# Patient Record
Sex: FEMALE | Race: WHITE | NOT HISPANIC OR LATINO | Employment: OTHER | URBAN - METROPOLITAN AREA
[De-identification: names, ages, dates, MRNs, and addresses within clinical notes are randomized per-mention and may not be internally consistent; named-entity substitution may affect disease eponyms.]

---

## 2024-08-08 ENCOUNTER — OFFICE VISIT (OUTPATIENT)
Dept: ORTHOPEDIC SURGERY | Facility: CLINIC | Age: 74
End: 2024-08-08
Payer: MEDICARE

## 2024-08-08 VITALS
HEIGHT: 63 IN | WEIGHT: 195.2 LBS | DIASTOLIC BLOOD PRESSURE: 80 MMHG | BODY MASS INDEX: 34.59 KG/M2 | SYSTOLIC BLOOD PRESSURE: 142 MMHG

## 2024-08-08 DIAGNOSIS — M75.22 BICEPS TENDINITIS OF LEFT UPPER EXTREMITY: ICD-10-CM

## 2024-08-08 DIAGNOSIS — M25.511 BILATERAL SHOULDER PAIN, UNSPECIFIED CHRONICITY: ICD-10-CM

## 2024-08-08 DIAGNOSIS — M75.52 BURSITIS OF LEFT SHOULDER: ICD-10-CM

## 2024-08-08 DIAGNOSIS — M67.911 DYSFUNCTION OF RIGHT ROTATOR CUFF: ICD-10-CM

## 2024-08-08 DIAGNOSIS — M25.512 BILATERAL SHOULDER PAIN, UNSPECIFIED CHRONICITY: ICD-10-CM

## 2024-08-08 DIAGNOSIS — S46.012A TRAUMATIC COMPLETE TEAR OF LEFT ROTATOR CUFF, INITIAL ENCOUNTER: Primary | ICD-10-CM

## 2024-08-08 PROCEDURE — 99204 OFFICE O/P NEW MOD 45 MIN: CPT

## 2024-08-08 RX ORDER — ASPIRIN 81 MG/1
81 TABLET ORAL DAILY
COMMUNITY

## 2024-08-08 RX ORDER — BIOTIN 1 MG
5000 TABLET ORAL DAILY
COMMUNITY

## 2024-08-08 RX ORDER — ACETAMINOPHEN 325 MG/1
650 TABLET ORAL EVERY 6 HOURS PRN
COMMUNITY

## 2024-08-08 RX ORDER — VALSARTAN 80 MG/1
1 TABLET ORAL DAILY
COMMUNITY
Start: 2024-07-29

## 2024-08-08 RX ORDER — BACLOFEN 10 MG/1
10 TABLET ORAL
COMMUNITY
Start: 2024-07-30

## 2024-08-08 RX ORDER — TEMAZEPAM 15 MG/1
15 CAPSULE ORAL NIGHTLY PRN
COMMUNITY

## 2024-08-08 RX ORDER — LEVOTHYROXINE SODIUM 0.07 MG/1
1 TABLET ORAL DAILY
COMMUNITY
Start: 2024-06-20

## 2024-08-08 RX ORDER — EZETIMIBE 10 MG/1
1 TABLET ORAL DAILY
COMMUNITY
Start: 2024-06-28

## 2024-08-08 NOTE — PROGRESS NOTES
[facility] Office Visit - Shania Morris PA-C    Office Visit       Patient Name: Dylan Garcia    Chief Complaint:   Chief Complaint   Patient presents with    Left Shoulder - Pain    Right Shoulder - Pain       Referring Physician: Jonathon Nayak MD    History of Present Illness:   Dylan Garcia is a 74 y.o. female who presents with bilateral shoulder pain.  Left worse than right.  She says she fell forward on 7/3/2024 landing on the left side of her face and left shoulder.  X-ray images and MRI of the left shoulder have been obtained.  Referred by her primary care provider for further evaluation.    She has a long history with bilateral shoulder pain since approximately 6 years ago.  She says she felt forward onto both shoulders at that time.  She was seen by an orthopedic surgeon in [place] who told her she would likely need shoulder surgery in the future.  Right shoulder had her worsening left prior to most recent injury.  No prior surgeries or injections to the shoulders.    Rates pain as 7/10.  Described as dull and aching.  She takes Tylenol as needed.    Here today with supportive .  She moved from [place] to [place] a couple of years ago.    Medical history includes severe aortic stenosis and congestive heart failure    Subjective     Review of Systems     Past Medical History:   Past Medical History:   Diagnosis Date    Hypertension 2021       Past Surgical History:   Past Surgical History:   Procedure Laterality Date    ANKLE SURGERY Right 1992    HYSTERECTOMY N/A 2000    LAPAROSCOPIC CHOLECYSTECTOMY N/A 2004    TOTAL KNEE ARTHROPLASTY Bilateral 2014       Family History:   Family History   Problem Relation Age of Onset    Cancer Mother        Social History:   Social History     Socioeconomic History    Marital status:    Tobacco Use    Smoking status: Never    Smokeless tobacco: Never    Vaping Use    Vaping status: Never Used   Substance and Sexual Activity    Alcohol use: Yes    Drug use: Never    Sexual activity: Defer       Medications:   Current Outpatient Medications:     acetaminophen (TYLENOL) 325 MG tablet, Take 2 tablets by mouth Every 6 (Six) Hours As Needed., Disp: , Rfl:     aspirin 81 MG EC tablet, Take 1 tablet by mouth Daily., Disp: , Rfl:     baclofen (LIORESAL) 10 MG tablet, Take 1 tablet by mouth every night at bedtime., Disp: , Rfl:     Biotin 1000 MCG tablet, Take 5,000 mcg by mouth Daily., Disp: , Rfl:     ezetimibe (ZETIA) 10 MG tablet, Take 1 tablet by mouth Daily., Disp: , Rfl:     levothyroxine (SYNTHROID, LEVOTHROID) 75 MCG tablet, Take 1 tablet by mouth Daily., Disp: , Rfl:     temazepam (RESTORIL) 15 MG capsule, Take 1 capsule by mouth At Night As Needed for Sleep., Disp: , Rfl:     valsartan (DIOVAN) 80 MG tablet, Take 1 tablet by mouth Daily., Disp: , Rfl:     Allergies:   Allergies   Allergen Reactions    Chlorhexidine Itching and Unknown (See Comments)    Povidone Iodine Itching and Unknown (See Comments)     Topical betadine    Adhesive Tape Rash    Amlodipine Unknown (See Comments)    Atorvastatin Unknown (See Comments)    Azithromycin Unknown (See Comments)    Cephalexin Unknown (See Comments)    Codeine Unknown (See Comments)    Iodine Rash    Latex Unknown (See Comments)    Morphine Unknown (See Comments)    Nickel Rash and Unknown (See Comments)    Other Unknown (See Comments)    Penicillins Unknown (See Comments)    Sulfa Antibiotics Rash    Sulindac Unknown (See Comments)    Warfarin Unknown (See Comments)       I have reviewed and updated the following portions of the patient's history and review of systems: allergies, current medications, past family history, past medical history, past social history, past surgical history and problem list.    Objective      Vital Signs:   Vitals:    08/08/24 1325   BP: 142/80   Weight: 88.5 kg (195 lb 3.2 oz)   Height:  "158.8 cm (62.5\")       Ortho Exam:  General: no acute distress, comfortable  Vitals reviewed in chart    Musculoskeletal Exam:    SIDE: Bilateral shoulders    Tenderness: Lateral rotator cuff into deltoid    Range of motion measurements (degrees): Left shoulder 130/130/60/40  Right shoulder 140/140/60/60  Painful arc of motion: Yes  Pain with sandra's testing  Negative lag sign bilaterally  No evidence of septic joint      Results Review:   XR Shoulder 2+ View Bilateral  Imaging: BILATERAL shoulder x-rays 2 views - AP and axillary x-ray views    Side: BILATERAL     Indication for BILATERAL  shoulder x-ray 2 views: BILATERAL shoulder pain    Comparison: Left shoulder MRI comparison views available    BILATERAL SHOULDER Findings: Bilateral shoulder x-rays reviewed.  No acute   bony findings noted.  Bilateral degenerative AC joint changes.  Bilateral   chronic changes to the greater tuberosities and subtle narrowing of the   acromiohumeral index bilaterally.    I personally reviewed the above x-rays.         Assessment / Plan      Assessment:  Diagnoses and all orders for this visit:    1. Traumatic complete tear of left rotator cuff, initial encounter (Primary)  -     Ambulatory Referral to Physical Therapy for Evaluation & Treatment    2. Bilateral shoulder pain, unspecified chronicity  -     XR Shoulder 2+ View Bilateral  -     Ambulatory Referral to Physical Therapy for Evaluation & Treatment    3. Dysfunction of right rotator cuff  -     Ambulatory Referral to Physical Therapy for Evaluation & Treatment    4. Biceps tendinitis of left upper extremity    5. Bursitis of left shoulder  -     Ambulatory Referral to Physical Therapy for Evaluation & Treatment        Quality Metrics:   BMI: 35.13       Tobacco:   Dylan Garcia  reports that she has never smoked. She has never used smokeless tobacco.           Plan:  X-ray images bilateral shoulders personally reviewed and interpreted today with Dr. Dickerson.  Evidence of " degenerative changes bilaterally with early signs of rotator cuff arthropathy.  No acute findings.  MRI images left shoulder 7/12/2024 [facility] imaging personally reviewed and interpreted with Dr. Dickerson.  Massive tearing of the supraspinatus and infraspinatus. Significant retraction noted.  Subscapularis intact. Biceps tendinopathy present.  We discussed treatment options including operative versus nonoperative management.  She has a massive rotator cuff tears that would likely be irreparable due to the significant retraction. Surgery not recommended at this time. We discussed potential reverse total shoulder arthroplasty in the future if shoulder continues to worsen over time.  Recommend physical therapy for deltoid strengthening of bilateral shoulders.  Reading protocol given.  We will reassess in a couple months.  May consider corticosteroid injection at that time.      Follow Up:   6-8 weeks    History, diagnosis and treatment plan discussed with Dr. Dickerson.        Shania Morris PA-C  [facility]       Dictated using Dragon Speech Recognition.